# Patient Record
Sex: MALE | NOT HISPANIC OR LATINO | URBAN - METROPOLITAN AREA
[De-identification: names, ages, dates, MRNs, and addresses within clinical notes are randomized per-mention and may not be internally consistent; named-entity substitution may affect disease eponyms.]

---

## 2023-03-09 ENCOUNTER — TELEPHONE (OUTPATIENT)
Dept: UROLOGY | Facility: AMBULATORY SURGERY CENTER | Age: 68
End: 2023-03-09

## 2023-03-09 NOTE — TELEPHONE ENCOUNTER
New Patient    What is the reason for the patient’s appointment? urolift discussion     What office location does the patient prefer? [facility]     Imaging/Lab Results:    Do we accept the patient's insurance or is the patient Self-Pay? Yes     Insurance Provider: Gabriella Cespedes Type/Number: [id number]  Member ID#: [id number]    Has the patient had any previous Urologist(s)? Dr Mila Benitez 6/2022    Have patient records been requested? If not are records showing in Epic: records will be faxed     Has the patient had any outside testing done? No     Does the patient have a personal history of cancer?  No